# Patient Record
Sex: MALE | Race: BLACK OR AFRICAN AMERICAN | Employment: UNEMPLOYED | URBAN - METROPOLITAN AREA
[De-identification: names, ages, dates, MRNs, and addresses within clinical notes are randomized per-mention and may not be internally consistent; named-entity substitution may affect disease eponyms.]

---

## 2017-06-22 ENCOUNTER — OFFICE VISIT (OUTPATIENT)
Dept: FAMILY MEDICINE CLINIC | Age: 22
End: 2017-06-22

## 2017-06-22 VITALS
HEIGHT: 70 IN | RESPIRATION RATE: 17 BRPM | OXYGEN SATURATION: 93 % | TEMPERATURE: 97.8 F | BODY MASS INDEX: 36.08 KG/M2 | DIASTOLIC BLOOD PRESSURE: 85 MMHG | SYSTOLIC BLOOD PRESSURE: 127 MMHG | WEIGHT: 252 LBS | HEART RATE: 62 BPM

## 2017-06-22 DIAGNOSIS — Z83.3 FH: DIABETES MELLITUS: ICD-10-CM

## 2017-06-22 DIAGNOSIS — L83 ACANTHOSIS NIGRICANS: ICD-10-CM

## 2017-06-22 DIAGNOSIS — Z13.220 SCREENING FOR HYPERLIPIDEMIA: ICD-10-CM

## 2017-06-22 DIAGNOSIS — Z13.1 SCREENING FOR DIABETES MELLITUS: ICD-10-CM

## 2017-06-22 DIAGNOSIS — E66.9 OBESITY (BMI 30-39.9): ICD-10-CM

## 2017-06-22 DIAGNOSIS — F32.3 SEVERE MAJOR DEPRESSION WITH PSYCHOTIC FEATURES (HCC): Primary | ICD-10-CM

## 2017-06-22 DIAGNOSIS — Z00.00 PHYSICAL EXAM: ICD-10-CM

## 2017-06-22 RX ORDER — BENZTROPINE MESYLATE 1 MG/1
TABLET ORAL
COMMUNITY
Start: 2017-06-19

## 2017-06-22 RX ORDER — FLUOXETINE HYDROCHLORIDE 20 MG/1
CAPSULE ORAL
COMMUNITY
Start: 2017-06-19

## 2017-06-22 RX ORDER — RISPERIDONE 2 MG/1
TABLET, FILM COATED ORAL
COMMUNITY
Start: 2017-06-19

## 2017-06-22 RX ORDER — AMPHETAMINE 12.5 MG/1
TABLET, ORALLY DISINTEGRATING ORAL
COMMUNITY
Start: 2017-06-09

## 2017-06-22 NOTE — PROGRESS NOTES
Chief Complaint   Patient presents with    Complete Physical     he is a 24y.o. year old male who presents for evalution. He takes several meds for depression and adhd  Here for a physical. He is active  Has no negative side effects from meds      Reviewed PmHx, RxHx, FmHx, SocHx, AllgHx and updated and dated in the chart.     Patient Active Problem List    Diagnosis    Asperger's disorder    ADHD (attention deficit hyperactivity disorder)    Severe major depression with psychotic features ([facility]    Attention deficit hyperactivity disorder       Nurse notes were reviewed and copied and are correct  Review of Systems - negative except as listed above in the HPI    Objective:     Vitals:    06/22/17 0950   BP: 127/85   Pulse: 62   Resp: 17   Temp: 97.8 °F (36.6 °C)   TempSrc: Oral   SpO2: 93%   Weight: 252 lb (114.3 kg)   Height: 5' 10\" (1.778 m)     Physical Examination: General appearance - alert, well appearing, and in no distress and oriented to person, place, and time  Mental status - alert, oriented to person, place, and time  Ears - bilateral TM's and external ear canals normal  Nose - normal and patent, no erythema, discharge or polyps  Mouth - mucous membranes moist, pharynx normal without lesions  Neck - supple, no significant adenopathy, hyperpigmented skin on the neck  Chest - clear to auscultation, no wheezes, rales or rhonchi, symmetric air entry  Heart - normal rate, regular rhythm, normal S1, S2, no murmurs, rubs, clicks or gallops  Abdomen - soft, nontender, nondistended, no masses or organomegaly  Neurological - alert, oriented, normal speech, no focal findings or movement disorder noted  Musculoskeletal - no joint tenderness, deformity or swelling  Extremities - peripheral pulses normal, no pedal edema, no clubbing or cyanosis  Skin - normal coloration and turgor, no rashes, no suspicious skin lesions noted         Assessment/ Plan:   Roberth Dobbins was seen today for complete physical.    Diagnoses and all orders for this visit:    Severe major depression with psychotic features ([facility]    Physical exam    FH: diabetes mellitus  -     HEMOGLOBIN A1C WITH EAG    Obesity (BMI 00-70.5)  -     METABOLIC PANEL, COMPREHENSIVE    Acanthosis nigricans  -     HEMOGLOBIN A1C WITH EAG    Screening for diabetes mellitus  -     HEMOGLOBIN A1C WITH EAG    Screening for hyperlipidemia  -     LIPID PANEL       Follow-up Disposition:  Return in about 1 year (around 6/22/2018). ICD-10-CM ICD-9-CM    1. Severe major depression with psychotic features ([facility] F32.3 296.24    2. Physical exam Z00.00 V70.9    3. FH: diabetes mellitus Z83.3 V18.0 HEMOGLOBIN A1C WITH EAG   4. Obesity (BMI 30-39. 9) N61.2 554.00 METABOLIC PANEL, COMPREHENSIVE   5. Acanthosis nigricans L83 701.2 HEMOGLOBIN A1C WITH EAG   6. Screening for diabetes mellitus Z13.1 V77.1 HEMOGLOBIN A1C WITH EAG   7. Screening for hyperlipidemia Z13.220 V77.91 LIPID PANEL       I have discussed the diagnosis with the patient and the intended plan as seen in the above orders. The patient has received an after-visit summary and questions were answered concerning future plans. Medication Side Effects and Warnings were discussed with patient: yes  Patient Labs were reviewed and or requested: yes  Patient Past Records were reviewed and or requested: yes        Patient Instructions        Hemoglobin A1c: About This Test  What is it? Hemoglobin A1c is a blood test that checks your average blood sugar level over the past 2 to 3 months. This test also is called a glycohemoglobin test or an A1c test.  Why is this test done? The A1c test is done to check how well your diabetes has been controlled over the past 2 to 3 months. Your doctor can use this information to adjust your medicine and diabetes treatment, if needed.   How can you prepare for the test?  You do not need to stop eating before you have an A1c test. This test can be done at any time during the day, even after a meal.  What happens during the test?  The health professional taking a sample of your blood will:  · Wrap an elastic band around your upper arm. This makes the veins below the band larger so it is easier to put a needle into the vein. · Clean the needle site with alcohol. · Put the needle into the vein. · Attach a tube to the needle to fill it with blood. · Remove the band from your arm when enough blood is collected. · Put a gauze pad or cotton ball over the needle site as the needle is removed. · Put pressure on the site and then put on a bandage. What else should you know about the test?  The test result is usually given as a percentage. The normal A1c is less than 5.7%. The A1c test result also can be used to find your estimated average glucose, or eAG. Your eAG and A1c show the same thing in two different ways. They both help you learn more about your average blood sugar range over the past 2 to 3 months. Where can you learn more? Go to http://luz maria-heriberto.info/. Enter U216 in the search box to learn more about \"Hemoglobin A1c: About This Test.\"  Current as of: March 13, 2017  Content Version: 11.3  © 1970-3447 [employer]. Care instructions adapted under license by [employer] (which disclaims liability or warranty for this information). If you have questions about a medical condition or this instruction, always ask your healthcare professional. [place] [address] any warranty or liability for your use of this information. Teens Recovering From Depression: Care Instructions  Your Care Instructions  Taking good care of yourself is important as you recover from depression. In time, your symptoms will fade as your treatment takes hold. Do not give up. Instead, focus your energy on getting better. Your mood will improve. It just takes some time.  Focus on things that can help you feel better, such as being with friends and family, eating well, and getting enough rest. But take things slowly. Do not do too much too soon. You will begin to feel better gradually. Follow-up care is a key part of your treatment and safety. Be sure to make and go to all appointments, and call your doctor if you are having problems. It's also a good idea to know your test results and keep a list of the medicines you take. How can you care for yourself at home? Be realistic  · If you have a large task to do, break it up into smaller steps you can handle, and just do what you can. · Think about putting off important decisions until your depression has lifted. If you have plans that will have a major impact on your life, such as dropping out of school or choosing a college, try to wait a bit. Talk it over with friends and family who can help you look at the overall picture. · Reach out to people for help. Do not isolate yourself. Let your family and friends help you. Find people you can trust and confide in, and talk to them. · Be patient, and be kind to yourself. Remember that depression is not your fault and is not something you can overcome with willpower alone. Treatment is necessary for depression, just like for any other illness. Feeling better takes time, and your mood will improve little by little. Stay active  · Stay busy and get outside. Join a school club or take part in school activities. Become a volunteer. · Get plenty of exercise every day. Go for a walk or jog, ride your bike, or play sports with friends. Talk with your doctor about an exercise program. Exercise can help with mild depression. · Go to a movie or concert. Take part in a [employer] activity or other social gathering. Go to a sports event. · Ask a friend to do things with you. You could play a computer game, go shopping, or listen to music, for example. Follow your treatment plan  · If your doctor prescribed medicine, take it exactly as prescribed.  Call your doctor if you think you are having a problem with your medicine. ¨ You may start to feel better within 1 to 3 weeks of taking antidepressant medicine. But it can take as many as 6 to 8 weeks to see more improvement. ¨ If you do not notice any improvement in 3 weeks, talk to your doctor. ¨ Antidepressants can make you feel tired, dizzy, or nervous. Some people have dry mouth, constipation, headaches, or diarrhea. Many of these side effects are mild and will go away on their own after you have been taking the medicine for a few weeks. Some may last longer. Talk to your doctor if side effects are bothering you too much. You might be able to try a different medicine. · Do not take medicines that have not been prescribed for you. They may interfere with medicines you may be taking for depression, or they may make your depression worse. · If you have a counselor, go to all your appointments. · Keep the numbers for these national suicide hotlines: 7-705-247-TALK (0-221.408.9142) and 5-918-XTNPCHV (1-800.576.7421). If you or someone you know talks about suicide or feeling hopeless, get help right away. Take care of yourself  · Eat a balanced diet with plenty of fresh fruits and vegetables, whole grains, and lean protein. If you have lost your appetite, eat small snacks rather than large meals. · Do not drink alcohol or use illegal drugs. · Get enough sleep. If you have problems sleeping:  ¨ Go to bed at the same time every night, and get up at the same time every morning. ¨ Keep your bedroom dark and quiet. ¨ Do not exercise after 5:00 p.m. ¨ Avoid drinks with caffeine after 5:00 p.m. · Avoid sleeping pills unless they are prescribed by the doctor treating your depression. Sleeping pills may make you groggy during the day, and they may interact with other medicine you are taking. · If you have any other illnesses, such as diabetes, make sure to continue with your treatment.  Tell your doctor about all of the medicines you take, including those with or without a prescription. When should you call for help? Call 911 anytime you think you may need emergency care. For example, call if:  · You are thinking about suicide or are threatening suicide. · You feel you cannot stop from hurting yourself or someone else. · You hear or see things that aren't real.  · You think or speak in a bizarre way that is not like your usual behavior. Call your doctor now or seek immediate medical care if:  · You are drinking a lot of alcohol or using illegal drugs. · You are talking or writing about death. Watch closely for changes in your health, and be sure to contact your doctor if:  · You find it hard or it's getting harder to deal with school, a job, family, or friends. · You think your treatment is not helping or you are not getting better. · Your symptoms get worse or you get new symptoms. · You have any problems with your antidepressant medicines, such as side effects, or you are thinking about stopping your medicine. · You are having manic behavior, such as having very high energy, needing less sleep than normal, or showing risky behavior such as spending money you don't have or abusing others verbally or physically. Where can you learn more? Go to http://luz maria-heriberto.info/. Enter L325 in the search box to learn more about \"Teens Recovering From Depression: Care Instructions. \"  Current as of: July 26, 2016  Content Version: 11.3  © 3551-0229 [email or web address]. Care instructions adapted under license by [employer] (which disclaims liability or warranty for this information). If you have questions about a medical condition or this instruction, always ask your healthcare professional. [address] any warranty or liability for your use of this information.         The patient verbalizes understanding and agrees with the plan of care        Patient has the advanced directives booklet to review

## 2017-06-22 NOTE — PROGRESS NOTES
1. Have you been to the ER, urgent care clinic since your last visit? Hospitalized since your last visit? No    2. Have you seen or consulted any other health care providers outside of the [address] [place] [address] since your last visit? Include any pap smears or colon screening.  No     Chief Complaint   Patient presents with    Complete Physical

## 2017-06-22 NOTE — PATIENT INSTRUCTIONS
Hemoglobin A1c: About This Test  What is it? Hemoglobin A1c is a blood test that checks your average blood sugar level over the past 2 to 3 months. This test also is called a glycohemoglobin test or an A1c test.  Why is this test done? The A1c test is done to check how well your diabetes has been controlled over the past 2 to 3 months. Your doctor can use this information to adjust your medicine and diabetes treatment, if needed. How can you prepare for the test?  You do not need to stop eating before you have an A1c test. This test can be done at any time during the day, even after a meal.  What happens during the test?  The health professional taking a sample of your blood will:  · Wrap an elastic band around your upper arm. This makes the veins below the band larger so it is easier to put a needle into the vein. · Clean the needle site with alcohol. · Put the needle into the vein. · Attach a tube to the needle to fill it with blood. · Remove the band from your arm when enough blood is collected. · Put a gauze pad or cotton ball over the needle site as the needle is removed. · Put pressure on the site and then put on a bandage. What else should you know about the test?  The test result is usually given as a percentage. The normal A1c is less than 5.7%. The A1c test result also can be used to find your estimated average glucose, or eAG. Your eAG and A1c show the same thing in two different ways. They both help you learn more about your average blood sugar range over the past 2 to 3 months. Where can you learn more? Go to http://luz maria-heriberto.info/. Enter U216 in the search box to learn more about \"Hemoglobin A1c: About This Test.\"  Current as of: March 13, 2017  Content Version: 11.3  © 0588-4886 [employer]). Care instructions adapted under license by [employer] (which disclaims liability or warranty for this information).  If you have questions about a medical condition or this instruction, always ask your healthcare professional. [address] any warranty or liability for your use of this information. Teens Recovering From Depression: Care Instructions  Your Care Instructions  Taking good care of yourself is important as you recover from depression. In time, your symptoms will fade as your treatment takes hold. Do not give up. Instead, focus your energy on getting better. Your mood will improve. It just takes some time. Focus on things that can help you feel better, such as being with friends and family, eating well, and getting enough rest. But take things slowly. Do not do too much too soon. You will begin to feel better gradually. Follow-up care is a key part of your treatment and safety. Be sure to make and go to all appointments, and call your doctor if you are having problems. It's also a good idea to know your test results and keep a list of the medicines you take. How can you care for yourself at home? Be realistic  · If you have a large task to do, break it up into smaller steps you can handle, and just do what you can. · Think about putting off important decisions until your depression has lifted. If you have plans that will have a major impact on your life, such as dropping out of school or choosing a college, try to wait a bit. Talk it over with friends and family who can help you look at the overall picture. · Reach out to people for help. Do not isolate yourself. Let your family and friends help you. Find people you can trust and confide in, and talk to them. · Be patient, and be kind to yourself. Remember that depression is not your fault and is not something you can overcome with willpower alone. Treatment is necessary for depression, just like for any other illness. Feeling better takes time, and your mood will improve little by little. Stay active  · Stay busy and get outside.  Join a school club or take part in school activities. Become a volunteer. · Get plenty of exercise every day. Go for a walk or jog, ride your bike, or play sports with friends. Talk with your doctor about an exercise program. Exercise can help with mild depression. · Go to a movie or concert. Take part in a [employer] activity or other social gathering. Go to a sports event. · Ask a friend to do things with you. You could play a computer game, go shopping, or listen to music, for example. Follow your treatment plan  · If your doctor prescribed medicine, take it exactly as prescribed. Call your doctor if you think you are having a problem with your medicine. ¨ You may start to feel better within 1 to 3 weeks of taking antidepressant medicine. But it can take as many as 6 to 8 weeks to see more improvement. ¨ If you do not notice any improvement in 3 weeks, talk to your doctor. ¨ Antidepressants can make you feel tired, dizzy, or nervous. Some people have dry mouth, constipation, headaches, or diarrhea. Many of these side effects are mild and will go away on their own after you have been taking the medicine for a few weeks. Some may last longer. Talk to your doctor if side effects are bothering you too much. You might be able to try a different medicine. · Do not take medicines that have not been prescribed for you. They may interfere with medicines you may be taking for depression, or they may make your depression worse. · If you have a counselor, go to all your appointments. · Keep the numbers for these national suicide hotlines: 2-164-592-TALK (1-329.753.4060) and 1-678-CSJKTVB (3-434.755.4963). If you or someone you know talks about suicide or feeling hopeless, get help right away. Take care of yourself  · Eat a balanced diet with plenty of fresh fruits and vegetables, whole grains, and lean protein. If you have lost your appetite, eat small snacks rather than large meals. · Do not drink alcohol or use illegal drugs. · Get enough sleep. If you have problems sleeping:  ¨ Go to bed at the same time every night, and get up at the same time every morning. ¨ Keep your bedroom dark and quiet. ¨ Do not exercise after 5:00 p.m. ¨ Avoid drinks with caffeine after 5:00 p.m. · Avoid sleeping pills unless they are prescribed by the doctor treating your depression. Sleeping pills may make you groggy during the day, and they may interact with other medicine you are taking. · If you have any other illnesses, such as diabetes, make sure to continue with your treatment. Tell your doctor about all of the medicines you take, including those with or without a prescription. When should you call for help? Call 911 anytime you think you may need emergency care. For example, call if:  · You are thinking about suicide or are threatening suicide. · You feel you cannot stop from hurting yourself or someone else. · You hear or see things that aren't real.  · You think or speak in a bizarre way that is not like your usual behavior. Call your doctor now or seek immediate medical care if:  · You are drinking a lot of alcohol or using illegal drugs. · You are talking or writing about death. Watch closely for changes in your health, and be sure to contact your doctor if:  · You find it hard or it's getting harder to deal with school, a job, family, or friends. · You think your treatment is not helping or you are not getting better. · Your symptoms get worse or you get new symptoms. · You have any problems with your antidepressant medicines, such as side effects, or you are thinking about stopping your medicine. · You are having manic behavior, such as having very high energy, needing less sleep than normal, or showing risky behavior such as spending money you don't have or abusing others verbally or physically. Where can you learn more? Go to http://luz maria-heriberto.info/.   Enter L325 in the search box to learn more about \"Teens Recovering From Depression: Care Instructions. \"  Current as of: July 26, 2016  Content Version: 11.3  © 3976-6531 [employer]. Care instructions adapted under license by [employer] (which disclaims liability or warranty for this information). If you have questions about a medical condition or this instruction, always ask your healthcare professional. [place] [address] any warranty or liability for your use of this information.

## 2017-06-22 NOTE — MR AVS SNAPSHOT
Visit Information Date & Time Provider Department Dept. Phone Encounter #  
 6/22/2017  9:30 AM Divya Henry MD [address] [place] [address] [id number] Follow-up Instructions Return in about 1 year (around 6/22/2018). Upcoming Health Maintenance Date Due  
 HPV AGE 9Y-34Y (1 of 3 - Male 3 Dose Series) 12/14/2006 INFLUENZA AGE 9 TO ADULT 8/1/2017 DTaP/Tdap/Td series (2 - Td) 8/3/2025 Allergies as of 6/22/2017  Review Complete On: 6/22/2017 By: Divya Henry MD  
 No Known Allergies Current Immunizations  Reviewed on 8/3/2015 Name Date Tdap 8/3/2015 Not reviewed this visit You Were Diagnosed With   
  
 Codes Comments Obesity (BMI 30-39.9)    -  Primary ICD-10-CM: U71.2 ICD-9-CM: 278.00 FH: diabetes mellitus     ICD-10-CM: Z83.3 ICD-9-CM: V18.0 Acanthosis nigricans     ICD-10-CM: L83 
ICD-9-CM: 701.2 Screening for diabetes mellitus     ICD-10-CM: Z13.1 ICD-9-CM: V77.1 Screening for hyperlipidemia     ICD-10-CM: Z13.220 ICD-9-CM: V77.91 Physical exam     ICD-10-CM: Z00.00 ICD-9-CM: V70.9 Vitals BP Pulse Temp Resp Height(growth percentile) Weight(growth percentile) 127/85 62 97.8 °F (36.6 °C) (Oral) 17 5' 10\" (1.778 m) 252 lb (114.3 kg) SpO2 BMI Smoking Status 93% 36.16 kg/m2 Never Smoker Vitals History BMI and BSA Data Body Mass Index Body Surface Area  
 36.16 kg/m 2 2.38 m 2 Preferred Pharmacy Pharmacy Name Phone [facility] [address] , [address] [phone] Your Updated Medication List  
  
   
This list is accurate as of: 6/22/17 10:13 AM.  Always use your most recent med list. ADZENYS XR-ODT 12.5 mg Tblb Generic drug:  amphetamine  
  
 benztropine 1 mg tablet Commonly known as:  COGENTIN  
  
 FLUoxetine 20 mg capsule Commonly known as:  PROzac  
  
 risperiDONE 2 mg tablet Commonly known as:  Sudhir We Performed the Following HEMOGLOBIN A1C WITH EAG [65851 CPT(R)] LIPID PANEL [47872 CPT(R)] METABOLIC PANEL, COMPREHENSIVE [01124 CPT(R)] Follow-up Instructions Return in about 1 year (around 6/22/2018). Patient Instructions Hemoglobin A1c: About This Test 
What is it? Hemoglobin A1c is a blood test that checks your average blood sugar level over the past 2 to 3 months. This test also is called a glycohemoglobin test or an A1c test. 
Why is this test done? The A1c test is done to check how well your diabetes has been controlled over the past 2 to 3 months. Your doctor can use this information to adjust your medicine and diabetes treatment, if needed. How can you prepare for the test? 
You do not need to stop eating before you have an A1c test. This test can be done at any time during the day, even after a meal. 
What happens during the test? 
The health professional taking a sample of your blood will: · Wrap an elastic band around your upper arm. This makes the veins below the band larger so it is easier to put a needle into the vein. · Clean the needle site with alcohol. · Put the needle into the vein. · Attach a tube to the needle to fill it with blood. · Remove the band from your arm when enough blood is collected. · Put a gauze pad or cotton ball over the needle site as the needle is removed. · Put pressure on the site and then put on a bandage. What else should you know about the test? 
The test result is usually given as a percentage. The normal A1c is less than 5.7%. The A1c test result also can be used to find your estimated average glucose, or eAG. Your eAG and A1c show the same thing in two different ways. They both help you learn more about your average blood sugar range over the past 2 to 3 months. Where can you learn more? Go to http://luz maria-heriberto.info/. Enter U216 in the search box to learn more about \"Hemoglobin A1c: About This Test.\" Current as of: March 13, 2017 Content Version: 11.3 © 7285-3766 [employer]. Care instructions adapted under license by [employer] (which disclaims liability or warranty for this information). If you have questions about a medical condition or this instruction, always ask your healthcare professional. [address] any warranty or liability for your use of this information. Teens Recovering From Depression: Care Instructions Your Care Instructions Taking good care of yourself is important as you recover from depression. In time, your symptoms will fade as your treatment takes hold. Do not give up. Instead, focus your energy on getting better. Your mood will improve. It just takes some time. Focus on things that can help you feel better, such as being with friends and family, eating well, and getting enough rest. But take things slowly. Do not do too much too soon. You will begin to feel better gradually. Follow-up care is a key part of your treatment and safety. Be sure to make and go to all appointments, and call your doctor if you are having problems. It's also a good idea to know your test results and keep a list of the medicines you take. How can you care for yourself at home? Be realistic · If you have a large task to do, break it up into smaller steps you can handle, and just do what you can. · Think about putting off important decisions until your depression has lifted. If you have plans that will have a major impact on your life, such as dropping out of school or choosing a college, try to wait a bit. Talk it over with friends and family who can help you look at the overall picture. · Reach out to people for help. Do not isolate yourself. Let your family and friends help you. Find people you can trust and confide in, and talk to them. · Be patient, and be kind to yourself. Remember that depression is not your fault and is not something you can overcome with willpower alone. Treatment is necessary for depression, just like for any other illness. Feeling better takes time, and your mood will improve little by little. Stay active · Stay busy and get outside. Join a school club or take part in school activities. Become a volunteer. · Get plenty of exercise every day. Go for a walk or jog, ride your bike, or play sports with friends. Talk with your doctor about an exercise program. Exercise can help with mild depression. · Go to a movie or concert. Take part in a Jewish activity or other social gathering. Go to a sports event. · Ask a friend to do things with you. You could play a computer game, go shopping, or listen to music, for example. Follow your treatment plan · If your doctor prescribed medicine, take it exactly as prescribed. Call your doctor if you think you are having a problem with your medicine. ¨ You may start to feel better within 1 to 3 weeks of taking antidepressant medicine. But it can take as many as 6 to 8 weeks to see more improvement. ¨ If you do not notice any improvement in 3 weeks, talk to your doctor. ¨ Antidepressants can make you feel tired, dizzy, or nervous. Some people have dry mouth, constipation, headaches, or diarrhea. Many of these side effects are mild and will go away on their own after you have been taking the medicine for a few weeks. Some may last longer. Talk to your doctor if side effects are bothering you too much. You might be able to try a different medicine. · Do not take medicines that have not been prescribed for you. They may interfere with medicines you may be taking for depression, or they may make your depression worse. · If you have a counselor, go to all your appointments.  
· Keep the numbers for these national suicide hotlines: 4-814-439-TALK (8-570-919-287-142-1814) and 8-577-FIMJUNI (9-159.655.6655). If you or someone you know talks about suicide or feeling hopeless, get help right away. Take care of yourself · Eat a balanced diet with plenty of fresh fruits and vegetables, whole grains, and lean protein. If you have lost your appetite, eat small snacks rather than large meals. · Do not drink alcohol or use illegal drugs. · Get enough sleep. If you have problems sleeping: ¨ Go to bed at the same time every night, and get up at the same time every morning. ¨ Keep your bedroom dark and quiet. ¨ Do not exercise after 5:00 p.m. ¨ Avoid drinks with caffeine after 5:00 p.m. · Avoid sleeping pills unless they are prescribed by the doctor treating your depression. Sleeping pills may make you groggy during the day, and they may interact with other medicine you are taking. · If you have any other illnesses, such as diabetes, make sure to continue with your treatment. Tell your doctor about all of the medicines you take, including those with or without a prescription. When should you call for help? Call 911 anytime you think you may need emergency care. For example, call if: 
· You are thinking about suicide or are threatening suicide. · You feel you cannot stop from hurting yourself or someone else. · You hear or see things that aren't real. 
· You think or speak in a bizarre way that is not like your usual behavior. Call your doctor now or seek immediate medical care if: 
· You are drinking a lot of alcohol or using illegal drugs. · You are talking or writing about death. Watch closely for changes in your health, and be sure to contact your doctor if: 
· You find it hard or it's getting harder to deal with school, a job, family, or friends. · You think your treatment is not helping or you are not getting better. · Your symptoms get worse or you get new symptoms.  
· You have any problems with your antidepressant medicines, such as side effects, or you are thinking about stopping your medicine. · You are having manic behavior, such as having very high energy, needing less sleep than normal, or showing risky behavior such as spending money you don't have or abusing others verbally or physically. Where can you learn more? Go to http://luz maria-heriberto.info/. Enter L325 in the search box to learn more about \"Teens Recovering From Depression: Care Instructions. \" Current as of: July 26, 2016 Content Version: 11.3 © 7913-0077 [employer]. Care instructions adapted under license by [employer] (which disclaims liability or warranty for this information). If you have questions about a medical condition or this instruction, always ask your healthcare professional. [address] any warranty or liability for your use of this information. Introducing [facility]! [employer] introduces Purch patient portal. Now you can access parts of your medical record, email your doctor's office, and request medication refills online. 1. In your internet browser, go to [email or web address] 2. Click on the First Time User? Click Here link in the Sign In box. You will see the New Member Sign Up page. 3. Enter your Purch Access Code exactly as it appears below. You will not need to use this code after youve completed the sign-up process. If you do not sign up before the expiration date, you must request a new code. · Purch Access Code: [email or web address] Expires: 9/20/2017 10:13 AM 
 
4. Enter the last four digits of your Social Security Number (xxxx) and Date of Birth (mm/dd/yyyy) as indicated and click Submit. You will be taken to the next sign-up page. 5. Create a Purch ID. This will be your Purch login ID and cannot be changed, so think of one that is secure and easy to remember. 6. Create a TouchPalt password. You can change your password at any time. 7. Enter your Password Reset Question and Answer. This can be used at a later time if you forget your password. 8. Enter your e-mail address. You will receive e-mail notification when new information is available in [address] 9. Click Sign Up. You can now view and download portions of your medical record. 10. Click the Download Summary menu link to download a portable copy of your medical information. If you have questions, please visit the Frequently Asked Questions section of the Vobile website. Remember, Vobile is NOT to be used for urgent needs. For medical emergencies, dial 911. Now available from your iPhone and Android! Please provide this summary of care documentation to your next provider. Your primary care clinician is listed as Myah Ward. If you have any questions after today's visit, please call [phone].

## 2017-06-23 LAB
ALBUMIN SERPL-MCNC: 4.7 G/DL (ref 3.5–5.5)
ALBUMIN/GLOB SERPL: 1.6 {RATIO} (ref 1.2–2.2)
ALP SERPL-CCNC: 47 IU/L (ref 39–117)
ALT SERPL-CCNC: 103 IU/L (ref 0–44)
AST SERPL-CCNC: 63 IU/L (ref 0–40)
BILIRUB SERPL-MCNC: 0.5 MG/DL (ref 0–1.2)
BUN SERPL-MCNC: 16 MG/DL (ref 6–20)
BUN/CREAT SERPL: 18 (ref 9–20)
CALCIUM SERPL-MCNC: 9.9 MG/DL (ref 8.7–10.2)
CHLORIDE SERPL-SCNC: 99 MMOL/L (ref 96–106)
CHOLEST SERPL-MCNC: 196 MG/DL (ref 100–199)
CO2 SERPL-SCNC: 23 MMOL/L (ref 18–29)
CREAT SERPL-MCNC: 0.88 MG/DL (ref 0.76–1.27)
EST. AVERAGE GLUCOSE BLD GHB EST-MCNC: 100 MG/DL
GLOBULIN SER CALC-MCNC: 3 G/DL (ref 1.5–4.5)
GLUCOSE SERPL-MCNC: 92 MG/DL (ref 65–99)
HBA1C MFR BLD: 5.1 % (ref 4.8–5.6)
HDLC SERPL-MCNC: 44 MG/DL
INTERPRETATION, 910389: NORMAL
LDLC SERPL CALC-MCNC: 134 MG/DL (ref 0–99)
POTASSIUM SERPL-SCNC: 4.3 MMOL/L (ref 3.5–5.2)
PROT SERPL-MCNC: 7.7 G/DL (ref 6–8.5)
SODIUM SERPL-SCNC: 137 MMOL/L (ref 134–144)
TRIGL SERPL-MCNC: 88 MG/DL (ref 0–149)
VLDLC SERPL CALC-MCNC: 18 MG/DL (ref 5–40)

## 2017-06-28 NOTE — PROGRESS NOTES
james liver test was abnormal. This might be from fatty liver  I want to recheck this in 1 month and see if the exercise he is doing makes this better. If not I want to do an ultrasound  The bad cholesterol (LDL) was high. The exercise and avoiding fried food, fast foods and junk food will also help lower the cholesterol , as well as the weight.  The liver can also benefit form these changes  Come back in 1 month

## 2017-06-29 ENCOUNTER — TELEPHONE (OUTPATIENT)
Dept: FAMILY MEDICINE CLINIC | Age: 22
End: 2017-06-29

## 2017-06-29 NOTE — TELEPHONE ENCOUNTER
Miss Dale Medina is calling to get test results for the patient; Miss Dale Medina states she does have authorization to get results for the patient; the best number to contact Waleska Leblanc is [phone]; thank you

## 2017-06-29 NOTE — PROGRESS NOTES
Spoke with mother and advised of lab results. Patient verbalized understanding and had no questions at this time. She will call back tomorrow to schedule appt.

## 2022-03-24 ENCOUNTER — HOSPITAL ENCOUNTER (EMERGENCY)
Age: 27
Discharge: HOME | End: 2022-03-24
Attending: EMERGENCY MEDICINE | Admitting: EMERGENCY MEDICINE

## 2022-03-24 VITALS
DIASTOLIC BLOOD PRESSURE: 94 MMHG | HEIGHT: 73 IN | HEART RATE: 108 BPM | BODY MASS INDEX: 33.25 KG/M2 | OXYGEN SATURATION: 95 % | TEMPERATURE: 98 F | SYSTOLIC BLOOD PRESSURE: 151 MMHG | RESPIRATION RATE: 16 BRPM

## 2022-03-24 DIAGNOSIS — Z71.1 MENTAL HEALTH-RELATED COMPLAINT: Primary | ICD-10-CM

## 2022-03-24 PROCEDURE — 90791 PSYCH DIAGNOSTIC EVALUATION: CPT

## 2022-03-24 PROCEDURE — 99282 EMERGENCY DEPT VISIT SF MDM: CPT

## 2022-03-24 NOTE — BSMART NOTE
Comprehensive Assessment Form Part 1      Section I - Disposition    Axis I - Depressive Disorder   ADHD    Axis II - Deferred  Axis III -   Past Medical History:   Diagnosis Date    Attention deficit hyperactivity disorder (ADHD)     Depression          The Medical Doctor to Psychiatrist conference was not completed. The Medical Doctor is in agreement with Psychiatrist disposition because of (reason) patient is not seeking admission and does not meet TDO critieria. The plan is discharge with resources and mother. The on-call Psychiatrist consulted was Dr. Joe Parks. The admitting Psychiatrist will be Dr. Joe Parks. The admitting Diagnosis is NA. The Payor source is self pay. This writer reviewed the [address] in nursing flowsheet and the risk level assigned is low risk. Based on this assessment, the risk of suicide is low risk and the plan is discharge home with mother and call to schedule therapy with resources list.    Section II - Integrated Summary  Summary:  Patient is a 31yo male who presents with mother due to reports of \"Pt arrives ambulatory to triage with c/o panic attacks. Pt states he recently stopped taking his ADHD mediations and has since had an increase in anxiety. Pt states he wants to be checked out by a mental health provider. \" Patient reports that he has stressors of not having his medication, financial, and work. He reports being picked on and singled out by his boss and he had to quit and found a new job that is better but is still upset. He reports fixating on this at times and struggles with feeling overwhelmed. Patient denies SI, HI, and hallucinations. He reports wishing he would not have to do anything and could just float away but denies wanting to die. He denies HI and hallucinations. He reports not having a therapist right now but has Dr Dwayne Byrd who he feels really listens to him.  He reports not having money and insurance which is leading him to space out his medications and not refill them. Discussed ways to get medications at a discount and talking to his psychiatrist about alternative brands. He reports wanting resources for a therapist. Counseling session completed with patient discussed ways to turn negative paranoid thoughts that new staff will have it out for him into a positive thought of the new job is a new opportunity and a better fit for him. Discussed patient with mother. She reports patient has had mood swings and has been irritable. She reports not SI or HI but concerned for him. She reports offering to help pay for medication but patient refused. She will help patient call Dr Bj Luna tomorrow and call therapy practices to find a male  [place] therapist.    The patienthas demonstrated mental capacity to provide informed consent. The information is given by the patient and parent. The Chief Complaint is medication. The Precipitant Factors are work, financial, and medication. Previous Hospitalizations: yes  The patient has not previously been in restraints. Current Psychiatrist and/or  is Dr Bj Luna. Lethality Assessment:    The potential for suicide noted by the following: not noted . The potential for homicide is not noted. The patient has not been a perpetrator of sexual or physical abuse. There are not pending charges. The patient is not felt to be at risk for self harm or harm to others. The attending nurse was advised that security has not been notified. Section III - Psychosocial  The patient's overall mood and attitude is distracted but appropriate. Feelings of helplessness and hopelessness are not observed. Generalized anxiety is observed by verbal report. Panic is not observed. Phobias are not observed. Obsessive compulsive tendencies are not observed. Section IV - Mental Status Exam  The patient's appearance shows no evidence of impairment.   The patient's behavior shows no evidence of impairment, shows poor impulse control and is restless. The patient is oriented to time, place, person and situation. The patient's speech shows no evidence of impairment. The patient's mood is depressed, is anxious and displays anhedonia. The range of affect is flat. The patient's thought content demonstrates no evidence of impairment. The thought process shows no evidence of impairment and is tangential.  The patient's perception shows no evidence of impairment. The patient's memory shows no evidence of impairment. The patient's appetite shows no evidence of impairment. The patient's sleep has evidence of insomnia. The patient's insight shows no evidence of impairment. The patient's judgement shows no evidence of impairment. Section V - Substance Abuse  The patient is not using substances. Section VI - Living Arrangements  The patient is single. The patient lives with a parent. The patient has no children. The patient does plan to return home upon discharge. The patient does not have legal issues pending. The patient's source of income comes from employment. Restorationism and cultural practices have not been voiced at this time. The patient's greatest support comes from mother and this person will be involved with the treatment. The patient has not been in an event described as horrible or outside the realm of ordinary life experience either currently or in the past.  The patient has not been a victim of sexual/physical abuse. Section VII - Other Areas of Clinical Concern  The highest grade achieved is not assessed with the overall quality of school experience being described as not assesed. The patient is currently employed and speaks [place] as a primary language. The patient has no communication impairments affecting communication. The patient's preference for learning can be described as: can read and write adequately.   The patient's hearing is normal.  The patient's vision is normal.      Alie Hallman LPC LSATP CSAC

## 2022-03-24 NOTE — ED TRIAGE NOTES
Pt arrives ambulatory to triage with c/o panic attacks. Pt states he recently stopped taking his ADHD mediations and has since had an increase in anxiety. Pt states he wants to be checked out by a mental health provider.

## 2022-03-24 NOTE — ED PROVIDER NOTES
Annabel Laurent is a 32 y.o. male wit PMhx of ADHD, depression, who presents to ED for mental health evaluation. Pt presents with mother. Pt and mother state patient has not been taking his medications consistently over the past few weeks and has stopped some of them. States he lost his job and insurance and has been spacing them out. Mother reports pt has had difficulty sleeping and had \"higher highs and lower lows\". Endorses intermittent emotional outbursts. States he is followed by Dr Ankush Granados for mental health evaluation and called him on the way over here. Pt endorses thoughts of self harm. Denies having specific plan. Denies SI or HI. Denies CP, SOB, abd pain, N/V, urinary or fecal changes. PMHx: Reviewed, as below. PSHx: Reviewed, as below. PCP: Ashlie De La Cruz MD    There are no other complaints verbalized at this time. Past Medical History:   Diagnosis Date    Attention deficit hyperactivity disorder (ADHD)     Depression        No past surgical history on file. Family History:   Problem Relation Age of Onset   [address] Hypertension Mother     Diabetes Mother     No Known Problems Father     No Known Problems Brother        Social History     Socioeconomic History    Marital status: SINGLE     Spouse name: Not on file    Number of children: Not on file    Years of education: Not on file    Highest education level: Not on file   Occupational History    Not on file   Tobacco Use    Smoking status: Never Smoker    Smokeless tobacco: Never Used   Substance and Sexual Activity    Alcohol use: No     Comment: occasional    Drug use: No    Sexual activity: Not Currently   Other Topics Concern    Not on file   Social History Narrative    Born in [place] and moved at age 3 to [facility]. HS graduate, pt was studying at [employer] ([address] [place] [address] [employer])    Single, no children, no legal problem. Lives with Parent.      Social Determinants of Health Financial Resource Strain:     Difficulty of Paying Living Expenses: Not on file   Food Insecurity:     Worried About Running Out of Food in the Last Year: Not on file    Yvrose of Food in the Last Year: Not on file   Transportation Needs:     Lack of Transportation (Medical): Not on file    Lack of Transportation (Non-Medical): Not on file   Physical Activity:     Days of Exercise per Week: Not on file    Minutes of Exercise per Session: Not on file   Stress:     Feeling of Stress : Not on file   Social Connections:     Frequency of Communication with Friends and Family: Not on file    Frequency of Social Gatherings with Friends and Family: Not on file    Attends Jewish Services: Not on file    Active Member of [address] [place] [address] or Organizations: Not on file    Attends Club or Organization Meetings: Not on file    Marital Status: Not on file   Intimate Partner Violence:     Fear of Current or Ex-Partner: Not on file    Emotionally Abused: Not on file    Physically Abused: Not on file    Sexually Abused: Not on file   Housing Stability:     Unable to Pay for Housing in the Last Year: Not on file    Number of [place] in the Last Year: Not on file    Unstable Housing in the Last Year: Not on file         ALLERGIES: Patient has no known allergies. Review of Systems   Respiratory: Negative for shortness of breath. Cardiovascular: Negative for chest pain. Gastrointestinal: Negative for abdominal pain, constipation, diarrhea, nausea and vomiting. Genitourinary: Negative for dysuria and frequency. Psychiatric/Behavioral: Positive for dysphoric mood, self-injury and sleep disturbance. Negative for suicidal ideas. All other systems reviewed and are negative. Vitals:    03/24/22 1448 03/24/22 1518   BP: (!) 156/90 (!) 151/94   Pulse: (!) 120 (!) 108   Resp: 16    Temp: 98 °F (36.7 °C)    SpO2: 95%    Height: 6' 1\" (1.854 m)             Physical Exam  Vitals and nursing note reviewed. Constitutional:       Appearance: He is not diaphoretic. HENT:      Head: Normocephalic. Right Ear: External ear normal.      Left Ear: External ear normal.   Eyes:      General: No scleral icterus. Cardiovascular:      Rate and Rhythm: Normal rate and regular rhythm. Heart sounds: Normal heart sounds. No murmur heard. Pulmonary:      Effort: Pulmonary effort is normal. No respiratory distress. Breath sounds: Normal breath sounds. No stridor. No wheezing, rhonchi or rales. Abdominal:      General: Bowel sounds are normal. There is no distension. Palpations: Abdomen is soft. There is no mass. Tenderness: There is no abdominal tenderness. There is no guarding or rebound. Hernia: No hernia is present. Musculoskeletal:         General: No swelling. Cervical back: Normal range of motion. Skin:     General: Skin is warm and dry. Neurological:      Mental Status: He is alert and oriented to person, place, and time. Mental status is at baseline. Gait: Gait normal.          MDM  Number of Diagnoses or Management Options     Amount and/or Complexity of Data Reviewed  Obtain history from someone other than the patient: yes (Mother)  Discuss the patient with other providers: yes (Dr Jam Aj, ED attending  [facility])           Procedures        3:45 PM  Change of shift. Care of patient signed over to [address] [place] [address] LITO Tomas. Bedside handoff complete. Awaiting BSMART evaluation. VITAL SIGNS:  Vitals:    03/24/22 1448 03/24/22 1518   BP: (!) 156/90 (!) 151/94   Pulse: (!) 120 (!) 108   Resp: 16    Temp: 98 °F (36.7 °C)    SpO2: 95%    Height: 6' 1\" (1.854 m)          LABS:  No results found for this or any previous visit (from the past 24 hour(s)). IMAGING:  No orders to display         Medications During Visit:  Medications - No data to display      DECISION MAKING:    Luis Black is a 32 y.o. male who comes in as above.  Presents afebrile and hemodynamically stable. Presents with mental health related complaint as above. BSMART to see for evaluation. End of shift occurred prior to completion of evaluation by Mayra. Further care continued by Eddi Pleitez PA-C. I have discussed care with ED attending. Alba Hollowayies Opportunity for questions presented. Pt and lakisha verbalizes their understanding and agreement with care plan. IMPRESSION:  1. Mental health-related complaint        DISPOSITION:  Pending    Please note that this dictation was completed with [employer], the computer voice recognition software. Quite often unanticipated grammatical, syntax, homophones, and other interpretive errors are inadvertently transcribed by the computer software. Please disregard these errors. Please excuse any errors that have escaped final proofreading.

## 2022-03-24 NOTE — ED NOTES
4:50 PM  Change of shift. Care of patient taken over from [employer] [facility]; H&P reviewed, bedside handoff complete. Pt has been evaluated by [facility] with [facility] who is not recommending inpatient care at this time. Pt has good outpatient follow-up, recommended following up with his prescribing provider of his stimulants for possible changing in dosing due to his increasing anxiety and trouble sleeping. Pt has been reevaluated. There are no new complaints, changes, or physical findings at this time. All results have been reviewed with patient and/or family. Medications have been reviewed w/ pt and/or family. Pt and/or family's questions have been answered. Pt and/or family expressed good understanding of the dx/tx/rx and is in agreement with plan of care. Pt instructed and agreed to f/u w/ PCP and to return to ED upon further deterioration. Pt is ready for discharge. IMPRESSION:  1. Mental health-related complaint        PLAN:  1. Current Discharge Medication List        2.    Follow-up Information     Follow up With Specialties Details Why Contact Info    Mynor Rico MD Family Medicine, Bariatrics Schedule an appointment as soon as possible for a visit   [address]  [phone]              Return to ED if worse

## 2023-05-16 RX ORDER — RISPERIDONE 2 MG/1
TABLET ORAL
COMMUNITY
Start: 2017-06-19

## 2023-05-16 RX ORDER — AMPHETAMINE 12.5 MG/1
TABLET, ORALLY DISINTEGRATING ORAL
COMMUNITY
Start: 2017-06-09

## 2023-05-16 RX ORDER — BENZTROPINE MESYLATE 1 MG/1
TABLET ORAL
COMMUNITY
Start: 2017-06-19

## 2023-05-16 RX ORDER — FLUOXETINE HYDROCHLORIDE 20 MG/1
CAPSULE ORAL
COMMUNITY
Start: 2017-06-19